# Patient Record
(demographics unavailable — no encounter records)

---

## 2024-12-02 NOTE — HISTORY OF PRESENT ILLNESS
[Former] : former [< 20 pack-years] : < 20 pack-years [Never] : never [TextBox_4] : 66yF with hx of anxiety/depression, GERD, HLD, admitted to [facility] from Wednesday to Saturday for bronchiectasis and COPD exacerbation, was discharged on cefuroxime and prednisone taper.   Currently,  Dyspnea on exertion on walking 2 blocks or climbing 2 flights No shortness of breath at rest.  No wheezing No chest tightness She has fever 2 months ago, but not now Cough, clear-white x 2 months, rib pain from coughing Runny nose Weight loss : 2lbs in 2 months Post nasal drips+  Pulmonary meds: No Inhalers Cefuroxime Prednisone  Did not get cough syrup because was expensive  Family Hx: Grand father had throat ca- smoker No asthma  First hospitalization for breathing issues No ICU stay Not on O2   [TextBox_11] : 1/2 [TextBox_13] : 16 [YearQuit] : 1994 [TextBox_29] : Passive smoking- parents, siblings smoked cig

## 2024-12-02 NOTE — REASON FOR VISIT
[Initial] : an initial visit [COPD] : COPD [Bronchiectasis] : bronchiectasis [Shortness of Breath] : shortness of breath [Other: _____] : [unfilled] [TextBox_44] :  declined by patient, daughter interpreted in [place]

## 2024-12-02 NOTE — PHYSICAL EXAM
[No Acute Distress] : no acute distress [Normal Oropharynx] : normal oropharynx [Normal Appearance] : normal appearance [No Neck Mass] : no neck mass [Normal Rate/Rhythm] : normal rate/rhythm [Normal S1, S2] : normal s1, s2 [No Murmurs] : no murmurs [No Resp Distress] : no resp distress [No Abnormalities] : no abnormalities [Benign] : benign [Normal Gait] : normal gait [No Clubbing] : no clubbing [No Cyanosis] : no cyanosis [No Edema] : no edema [FROM] : FROM [Normal Color/ Pigmentation] : normal color/ pigmentation [No Focal Deficits] : no focal deficits [Oriented x3] : oriented x3 [Normal Affect] : normal affect [TextBox_68] : bibasilar crackles coarse

## 2024-12-02 NOTE — ASSESSMENT
[FreeTextEntry1] : CT chest IMPRESSION: Mild lower lobe bronchiectasis. Infectious/inflammatory small airways process in the posterior left lower lobe.  #Viral or atypical bacterial pneumonia # Bronchiectasis  Focal area of bronchiectasis could be related to postinfectious changes. Complete cefuroxime antibiotic course Complete prednisone taper Start Flonase nasal spray Take Mucinex 3 times a day for at least next 5 to 7 days and then as needed Prescription provided for Aerobika/flutter valve vibratory device to help loosen secretions and cough up the phlegm  -On repeat visit in 6 weeks we will get chest imaging x-ray/CT chest to jadyn resolution of the current tree-in-bud and nodular opacities.  -Will obtain PFTs with pre and postbronchodilator response  Return to clinic in 6 weeks or sooner if need be.

## 2024-12-02 NOTE — HISTORY OF PRESENT ILLNESS
[Former] : former [< 20 pack-years] : < 20 pack-years [Never] : never [TextBox_4] : 66yF with hx of anxiety/depression, GERD, HLD, admitted to [facility] from Wednesday to Saturday for bronchiectasis and COPD exacerbation, was discharged on cefuroxime and prednisone taper.   Currently,  Dyspnea on exertion on walking 2 blocks or climbing 2 flights No shortness of breath at rest.  No wheezing No chest tightness She has fever 2 months ago, but not now Cough, clear-white x 2 months, rib pain from coughing Runny nose Weight loss : 2lbs in 2 months Post nasal drips+  Pulmonary meds: No Inhalers Cefuroxime Prednisone  Did not get cough syrup because was expensive  Family Hx: Grand father had throat ca- smoker No asthma  First hospitalization for breathing issues No ICU stay Not on O2   [TextBox_11] : 1/2 [TextBox_13] : 16 [YearQuit] : 1994 [TextBox_29] : Passive smoking- parents, siblings smoked cig

## 2024-12-02 NOTE — REASON FOR VISIT
[Initial] : an initial visit [COPD] : COPD [Bronchiectasis] : bronchiectasis [Shortness of Breath] : shortness of breath [Other: _____] : [unfilled] [TextBox_44] :  declined by patient, daughter interpreted in [place]